# Patient Record
Sex: FEMALE | Race: WHITE | NOT HISPANIC OR LATINO
[De-identification: names, ages, dates, MRNs, and addresses within clinical notes are randomized per-mention and may not be internally consistent; named-entity substitution may affect disease eponyms.]

---

## 2022-09-12 ENCOUNTER — APPOINTMENT (OUTPATIENT)
Dept: PULMONOLOGY | Facility: CLINIC | Age: 37
End: 2022-09-12

## 2022-09-12 VITALS
DIASTOLIC BLOOD PRESSURE: 86 MMHG | HEIGHT: 62 IN | RESPIRATION RATE: 12 BRPM | OXYGEN SATURATION: 96 % | BODY MASS INDEX: 43.24 KG/M2 | WEIGHT: 235 LBS | SYSTOLIC BLOOD PRESSURE: 119 MMHG | TEMPERATURE: 97 F | HEART RATE: 78 BPM

## 2022-09-12 PROBLEM — Z00.00 ENCOUNTER FOR PREVENTIVE HEALTH EXAMINATION: Status: ACTIVE | Noted: 2022-09-12

## 2022-09-12 PROCEDURE — 99203 OFFICE O/P NEW LOW 30 MIN: CPT

## 2022-09-13 NOTE — HISTORY OF PRESENT ILLNESS
[FreeTextEntry1] : 09/12/2022 :  AMILCAR GUEVARA is a 37 year old  female with PMHx obesity, depression on Effexor, known MARIELA on CPAP,  pre-DM , ADD on Adderall who is here for initial evaluation \par \par She had sleep study in May as part of the w/u for bariatric surgery and was placed on CPAP with set pressure of 10 cm h2o. She states has a provider  who  is following her CPAP  therapy.  She was asked to have pulmonary clearance as part of her w/u. \par \par She denies SOB, cough and recent WT changes. Never smoked, no hx asthma. \par \par She is now here for further management and follow up for her bariatric surgery. \par \par

## 2022-09-13 NOTE — PHYSICAL EXAM
[General Appearance - In No Acute Distress] : no acute distress [Enlarged Base of the Tongue] : enlargement of the base of the tongue [IV] : IV [Neck Appearance] : the appearance of the neck was normal [Neck Cervical Mass (___cm)] : no neck mass was observed [Apical Impulse] : the apical impulse was normal [Heart Sounds] : normal S1 and S2 [] : no respiratory distress [Exaggerated Use Of Accessory Muscles For Inspiration] : no accessory muscle use [Involuntary Movements] : no involuntary movements were seen [No Focal Deficits] : no focal deficits [Oriented To Time, Place, And Person] : oriented to person, place, and time [Affect] : the affect was normal [FreeTextEntry1] : large neck

## 2022-09-13 NOTE — ASSESSMENT
[FreeTextEntry1] : 36 y/o obese F with MARIELA on CPAP, pre-dm, who is here for further w/u before her bariatric surgery.  Not at high risk for pulmonary disease, will get pulmonary function testing and if requested can follow her for sleep disordered breathing here as well.  f/u after pulmonary function testing

## 2022-10-12 ENCOUNTER — APPOINTMENT (OUTPATIENT)
Dept: PULMONOLOGY | Facility: CLINIC | Age: 37
End: 2022-10-12

## 2022-10-12 VITALS
SYSTOLIC BLOOD PRESSURE: 126 MMHG | HEIGHT: 62 IN | HEART RATE: 90 BPM | RESPIRATION RATE: 12 BRPM | TEMPERATURE: 97.9 F | OXYGEN SATURATION: 97 % | WEIGHT: 234 LBS | BODY MASS INDEX: 43.06 KG/M2 | DIASTOLIC BLOOD PRESSURE: 88 MMHG

## 2022-10-12 DIAGNOSIS — Z01.818 ENCOUNTER FOR OTHER PREPROCEDURAL EXAMINATION: ICD-10-CM

## 2022-10-12 DIAGNOSIS — E66.01 MORBID (SEVERE) OBESITY DUE TO EXCESS CALORIES: ICD-10-CM

## 2022-10-12 DIAGNOSIS — G47.33 OBSTRUCTIVE SLEEP APNEA (ADULT) (PEDIATRIC): ICD-10-CM

## 2022-10-12 PROCEDURE — 94727 GAS DIL/WSHOT DETER LNG VOL: CPT | Mod: 26

## 2022-10-12 PROCEDURE — 94010 BREATHING CAPACITY TEST: CPT | Mod: 26

## 2022-10-12 PROCEDURE — 99213 OFFICE O/P EST LOW 20 MIN: CPT | Mod: 25

## 2022-10-12 PROCEDURE — 94729 DIFFUSING CAPACITY: CPT | Mod: 26

## 2022-10-12 PROCEDURE — ZZZZZ: CPT

## 2022-10-12 NOTE — ASSESSMENT
[FreeTextEntry1] : No evidence of clinical pulmonary disease\par Obstructive sleep apnea appears adequately controlled with CPAP\par \par Cleared for planned bariatric surgery from our point of view.  She should inform the anesthesiologist that she has sleep apnea and might bring the CPAP machine with her she is to spend the night in the hospital.  Usual precautions should be taken perioperatively for patient with obstructive sleep apnea.  She should be reevaluated for obstructive sleep apnea when she has the expected substantial weight loss after surgery.

## 2022-10-12 NOTE — HISTORY OF PRESENT ILLNESS
[FreeTextEntry1] : 09/12/2022 :  AMILCAR GUEVARA is a 37 year old  female with PMHx obesity, depression on Effexor, known MARIELA on CPAP,  pre-DM , ADD on Adderall who is here for initial evaluation \par \par She had sleep study in May as part of the w/u for bariatric surgery and was placed on CPAP with set pressure of 10 cm h2o. She states has a provider  who  is following her CPAP  therapy.  She was asked to have pulmonary clearance as part of her w/u. \par \par 10/12/22: Pulmonary function testing done today as part of preoperative clearance reviewed with patient: Spirometry normal, FEV1 115% predicted, lung volumes normal, diffusion normal.  She tells me she continues to use CPAP every night, apnea-hypopnea index when she checks in the morning is generally 5 or less.\par \par

## 2022-10-12 NOTE — PHYSICAL EXAM
[General Appearance - Well Developed] : well developed [Normal Appearance] : normal appearance [Well Groomed] : well groomed [General Appearance - Well Nourished] : well nourished [No Deformities] : no deformities [General Appearance - In No Acute Distress] : no acute distress [] : no respiratory distress [Auscultation Breath Sounds / Voice Sounds] : lungs were clear to auscultation bilaterally [Abnormal Walk] : normal gait [Nail Clubbing] : no clubbing  or cyanosis of the fingernails [Musculoskeletal - Swelling] : no joint swelling seen [Motor Tone] : muscle strength and tone were normal